# Patient Record
Sex: MALE | Race: BLACK OR AFRICAN AMERICAN | NOT HISPANIC OR LATINO
[De-identification: names, ages, dates, MRNs, and addresses within clinical notes are randomized per-mention and may not be internally consistent; named-entity substitution may affect disease eponyms.]

---

## 2023-06-20 PROBLEM — Z00.00 ENCOUNTER FOR PREVENTIVE HEALTH EXAMINATION: Status: ACTIVE | Noted: 2023-06-20

## 2023-06-21 ENCOUNTER — APPOINTMENT (OUTPATIENT)
Dept: CARDIOLOGY | Facility: CLINIC | Age: 48
End: 2023-06-21

## 2023-07-05 ENCOUNTER — APPOINTMENT (OUTPATIENT)
Dept: CARDIOLOGY | Facility: CLINIC | Age: 48
End: 2023-07-05
Payer: MEDICARE

## 2023-07-19 ENCOUNTER — APPOINTMENT (OUTPATIENT)
Dept: CARDIOLOGY | Facility: CLINIC | Age: 48
End: 2023-07-19
Payer: MEDICARE

## 2023-07-19 ENCOUNTER — NON-APPOINTMENT (OUTPATIENT)
Age: 48
End: 2023-07-19

## 2023-07-19 VITALS
OXYGEN SATURATION: 95 % | DIASTOLIC BLOOD PRESSURE: 70 MMHG | HEART RATE: 64 BPM | SYSTOLIC BLOOD PRESSURE: 124 MMHG | WEIGHT: 145 LBS

## 2023-07-19 VITALS — SYSTOLIC BLOOD PRESSURE: 122 MMHG | DIASTOLIC BLOOD PRESSURE: 72 MMHG

## 2023-07-19 DIAGNOSIS — R47.01 APHASIA: ICD-10-CM

## 2023-07-19 DIAGNOSIS — Z86.39 PERSONAL HISTORY OF OTHER ENDOCRINE, NUTRITIONAL AND METABOLIC DISEASE: ICD-10-CM

## 2023-07-19 DIAGNOSIS — R94.31 ABNORMAL ELECTROCARDIOGRAM [ECG] [EKG]: ICD-10-CM

## 2023-07-19 PROCEDURE — 99203 OFFICE O/P NEW LOW 30 MIN: CPT

## 2023-07-19 PROCEDURE — 93000 ELECTROCARDIOGRAM COMPLETE: CPT

## 2023-07-19 RX ORDER — SIMVASTATIN 10 MG/1
10 TABLET, FILM COATED ORAL
Qty: 90 | Refills: 0 | Status: ACTIVE | COMMUNITY

## 2023-07-19 NOTE — DISCUSSION/SUMMARY
[FreeTextEntry1] : 1. Abnormal ECG: recommend echocardiogram.\par \par Office will call with results [EKG obtained to assist in diagnosis and management of assessed problem(s)] : EKG obtained to assist in diagnosis and management of assessed problem(s)

## 2023-08-04 ENCOUNTER — APPOINTMENT (OUTPATIENT)
Dept: CARDIOLOGY | Facility: CLINIC | Age: 48
End: 2023-08-04
Payer: MEDICARE

## 2023-08-04 PROCEDURE — 93306 TTE W/DOPPLER COMPLETE: CPT

## 2024-05-30 ENCOUNTER — APPOINTMENT (OUTPATIENT)
Dept: OPHTHALMOLOGY | Facility: CLINIC | Age: 49
End: 2024-05-30

## 2025-01-23 ENCOUNTER — APPOINTMENT (OUTPATIENT)
Dept: OPHTHALMOLOGY | Facility: CLINIC | Age: 50
End: 2025-01-23

## 2025-01-23 PROCEDURE — 92004 COMPRE OPH EXAM NEW PT 1/>: CPT
